# Patient Record
Sex: MALE | Race: WHITE | NOT HISPANIC OR LATINO | URBAN - METROPOLITAN AREA
[De-identification: names, ages, dates, MRNs, and addresses within clinical notes are randomized per-mention and may not be internally consistent; named-entity substitution may affect disease eponyms.]

---

## 2017-10-17 ENCOUNTER — OFFICE VISIT - RIVER FALLS (OUTPATIENT)
Dept: FAMILY MEDICINE | Facility: CLINIC | Age: 67
End: 2017-10-17

## 2017-10-17 ASSESSMENT — MIFFLIN-ST. JEOR
SCORE: 1612.67
SCORE: 1613.57

## 2017-10-25 ENCOUNTER — AMBULATORY - RIVER FALLS (OUTPATIENT)
Dept: FAMILY MEDICINE | Facility: CLINIC | Age: 67
End: 2017-10-25

## 2018-10-30 ENCOUNTER — OFFICE VISIT - RIVER FALLS (OUTPATIENT)
Dept: FAMILY MEDICINE | Facility: CLINIC | Age: 68
End: 2018-10-30

## 2018-10-30 ASSESSMENT — MIFFLIN-ST. JEOR: SCORE: 1627.18

## 2018-11-05 ENCOUNTER — AMBULATORY - RIVER FALLS (OUTPATIENT)
Dept: FAMILY MEDICINE | Facility: CLINIC | Age: 68
End: 2018-11-05

## 2018-11-06 LAB
CHOLEST SERPL-MCNC: 140 MG/DL
CHOLEST/HDLC SERPL: 2.8 {RATIO}
CREAT SERPL-MCNC: 0.79 MG/DL (ref 0.7–1.25)
GLUCOSE BLD-MCNC: 98 MG/DL (ref 65–99)
HDLC SERPL-MCNC: 50 MG/DL
LDLC SERPL CALC-MCNC: 76 MG/DL
NONHDLC SERPL-MCNC: 90 MG/DL
TRIGL SERPL-MCNC: 61 MG/DL

## 2019-10-17 ENCOUNTER — OFFICE VISIT - RIVER FALLS (OUTPATIENT)
Dept: FAMILY MEDICINE | Facility: CLINIC | Age: 69
End: 2019-10-17

## 2019-10-17 ASSESSMENT — MIFFLIN-ST. JEOR: SCORE: 1642.26

## 2019-10-21 ENCOUNTER — AMBULATORY - RIVER FALLS (OUTPATIENT)
Dept: FAMILY MEDICINE | Facility: CLINIC | Age: 69
End: 2019-10-21

## 2019-10-22 ENCOUNTER — COMMUNICATION - RIVER FALLS (OUTPATIENT)
Dept: FAMILY MEDICINE | Facility: CLINIC | Age: 69
End: 2019-10-22

## 2019-10-22 LAB
BUN SERPL-MCNC: 17 MG/DL (ref 7–25)
BUN/CREAT RATIO - HISTORICAL: NORMAL (ref 6–22)
CALCIUM SERPL-MCNC: 9.6 MG/DL (ref 8.6–10.3)
CHLORIDE BLD-SCNC: 104 MMOL/L (ref 98–110)
CHOLEST SERPL-MCNC: 141 MG/DL
CHOLEST/HDLC SERPL: 3.4 {RATIO}
CO2 SERPL-SCNC: 26 MMOL/L (ref 20–32)
CREAT SERPL-MCNC: 0.91 MG/DL (ref 0.7–1.25)
EGFRCR SERPLBLD CKD-EPI 2021: 87 ML/MIN/1.73M2
ERYTHROCYTE [DISTWIDTH] IN BLOOD BY AUTOMATED COUNT: 12.6 % (ref 11–15)
GLUCOSE BLD-MCNC: 91 MG/DL (ref 65–99)
HCT VFR BLD AUTO: 47.3 % (ref 38.5–50)
HDLC SERPL-MCNC: 41 MG/DL
HGB BLD-MCNC: 16.2 GM/DL (ref 13.2–17.1)
LDLC SERPL CALC-MCNC: 86 MG/DL
MCH RBC QN AUTO: 29.4 PG (ref 27–33)
MCHC RBC AUTO-ENTMCNC: 34.2 GM/DL (ref 32–36)
MCV RBC AUTO: 85.8 FL (ref 80–100)
NONHDLC SERPL-MCNC: 100 MG/DL
PLATELET # BLD AUTO: 271 10*3/UL (ref 140–400)
PMV BLD: 10.5 FL (ref 7.5–12.5)
POTASSIUM BLD-SCNC: 5.2 MMOL/L (ref 3.5–5.3)
RBC # BLD AUTO: 5.51 10*6/UL (ref 4.2–5.8)
SODIUM SERPL-SCNC: 138 MMOL/L (ref 135–146)
TRIGL SERPL-MCNC: 62 MG/DL
WBC # BLD AUTO: 6.8 10*3/UL (ref 3.8–10.8)

## 2020-10-19 ENCOUNTER — OFFICE VISIT - RIVER FALLS (OUTPATIENT)
Dept: FAMILY MEDICINE | Facility: CLINIC | Age: 70
End: 2020-10-19

## 2020-10-19 ASSESSMENT — MIFFLIN-ST. JEOR: SCORE: 1637.73

## 2020-10-20 LAB
BUN SERPL-MCNC: 22 MG/DL (ref 7–25)
BUN/CREAT RATIO - HISTORICAL: ABNORMAL (ref 6–22)
CALCIUM SERPL-MCNC: 9.1 MG/DL (ref 8.6–10.3)
CHLORIDE BLD-SCNC: 105 MMOL/L (ref 98–110)
CHOLEST SERPL-MCNC: 113 MG/DL
CHOLEST/HDLC SERPL: 3.1 {RATIO}
CO2 SERPL-SCNC: 30 MMOL/L (ref 20–32)
CREAT SERPL-MCNC: 0.8 MG/DL (ref 0.7–1.25)
EGFRCR SERPLBLD CKD-EPI 2021: 92 ML/MIN/1.73M2
GLUCOSE BLD-MCNC: 104 MG/DL (ref 65–99)
HDLC SERPL-MCNC: 36 MG/DL
LDLC SERPL CALC-MCNC: 64 MG/DL
NONHDLC SERPL-MCNC: 77 MG/DL
POTASSIUM BLD-SCNC: 4 MMOL/L (ref 3.5–5.3)
SODIUM SERPL-SCNC: 139 MMOL/L (ref 135–146)
TRIGL SERPL-MCNC: 44 MG/DL

## 2020-10-21 ENCOUNTER — COMMUNICATION - RIVER FALLS (OUTPATIENT)
Dept: FAMILY MEDICINE | Facility: CLINIC | Age: 70
End: 2020-10-21

## 2022-02-11 VITALS
BODY MASS INDEX: 32.75 KG/M2 | DIASTOLIC BLOOD PRESSURE: 78 MMHG | HEIGHT: 66 IN | WEIGHT: 203.8 LBS | SYSTOLIC BLOOD PRESSURE: 132 MMHG | TEMPERATURE: 98.1 F | HEART RATE: 68 BPM

## 2022-02-11 VITALS
HEART RATE: 60 BPM | OXYGEN SATURATION: 98 % | HEART RATE: 58 BPM | BODY MASS INDEX: 32.24 KG/M2 | WEIGHT: 200.8 LBS | WEIGHT: 200.6 LBS | DIASTOLIC BLOOD PRESSURE: 76 MMHG | HEIGHT: 66 IN | SYSTOLIC BLOOD PRESSURE: 116 MMHG | SYSTOLIC BLOOD PRESSURE: 134 MMHG | HEIGHT: 66 IN | DIASTOLIC BLOOD PRESSURE: 68 MMHG | OXYGEN SATURATION: 95 % | BODY MASS INDEX: 32.27 KG/M2

## 2022-02-11 VITALS
DIASTOLIC BLOOD PRESSURE: 84 MMHG | BODY MASS INDEX: 33.27 KG/M2 | HEART RATE: 68 BPM | HEIGHT: 66 IN | WEIGHT: 207 LBS | SYSTOLIC BLOOD PRESSURE: 132 MMHG | TEMPERATURE: 97.5 F

## 2022-02-11 VITALS
SYSTOLIC BLOOD PRESSURE: 122 MMHG | BODY MASS INDEX: 33.43 KG/M2 | HEART RATE: 60 BPM | HEIGHT: 66 IN | WEIGHT: 208 LBS | DIASTOLIC BLOOD PRESSURE: 70 MMHG

## 2022-02-16 NOTE — PROGRESS NOTES
Patient:   DANIELLE RODRIGUEZ            MRN: [id number]            FIN: [id number]               Age:   69 years     Sex:  Male     :  1950   Associated Diagnoses:   CAD (coronary artery disease)   Author:   Ladarius Delvalle MD      Visit Information      Date of Service: 10/17/2019 03:44 pm  Performing Location: [facility]  Encounter#: [id number]      Primary Care Provider (PCP):  Renny Albrecht PA-C    NPI# [id number]      Referring Provider:  Ladarius Delvalle MD    NPI# [id number]      Chief Complaint   10/17/2019 4:09 PM CDT   Annual Exam & CAD Med check & due for blood work; Wellness Paperwork for work     Chief complaint and symptoms noted above confirmed with patient.      Interval History   Ischemic Heart Disease   The course is progressing as expected.  The effect on daily activities is no change in activity level and no change in household functions.  Associated symptoms characterized by no fatigue, weight gain, chest pain, edema: peripheral or shortness of breath.  No anginal episodes.     Exclusions   Adherence Characterized by patient is taking meds as directed.        Review of Systems   Constitutional:  Negative.    Ear/Nose/Mouth/Throat:  Negative.    Respiratory:  Negative.    Cardiovascular:  Negative.    Gastrointestinal:  Negative.    Genitourinary:  Negative.    Hematology/Lymphatics:  Negative.    Endocrine:  Negative.    Immunologic:  Negative.    Musculoskeletal:  Negative.    Integumentary:  Negative.    Neurologic:  Negative.    Psychiatric:  Negative.       Health Status   Allergies:    Allergic Reactions (Selected)  No Known Medication Allergies   Medications:  (Selected)   Prescriptions  Prescribed  Metoprolol Tartrate 25 mg oral tablet: = 1 tab(s) ( 25 mg ), po, bid, # 180 tab(s), 3 Refill(s), Type: Maintenance, [id number], 1 tab(s) Oral bid  aspirin 81 mg oral tablet: = 1 tab(s) ( 81 mg ), PO, Daily, # 90 tab(s), 3 Refill(s),  Type: Maintenance, Pharmacy: [facility] #[id number], 1 tab(s) Oral daily  atorvastatin 80 mg oral tablet: = 1 tab(s) ( 80 mg ), PO, Daily, # 90 tab(s), 3 Refill(s), Type: Maintenance, Pharmacy: [facility] #[id number], 1 tab(s) Oral daily  lisinopril 2.5 mg oral tablet: = 1 tab(s) ( 2.5 mg ), PO, Daily, # 90 tab(s), 3 Refill(s), Type: Maintenance, Pharmacy: [facility] #[id number], 1 tab(s) Oral daily  Documented Medications  Documented  Vitamin C 500 mg oral tablet: 1 tab(s) ( 500 mg ), po, daily, 0 Refill(s), Type: Maintenance  Vitamin D3 1000 intl units oral tablet: 1 tab(s) ( 1,000 International Unit ), po, daily, 0 Refill(s), Type: Maintenance   Problem list:    All Problems  CAD (coronary artery disease) / SNOMED CT 2921839747 / Confirmed  Obesity / SNOMED CT 5350787690 / Probable      Histories   Past Medical History:    Active  Obesity (SNOMED CT 8879968725)  CAD (coronary artery disease) (SNOMED CT 1429489129)   Family History:    Cancer  Brother  Heart disease  Mother ()  Stroke  Son ([place])     Procedure history:    Angioplasty (SNOMED CT 6051149440) performed by Rigo Celis MD on 10/10/2015 at 65 Years.   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      Home and Environment Assessment            Marital status: .  Spouse/Partner name: Ligia.  Lives with Spouse.  6 children.  Living situation:               Home/Independent.      Nutrition and Health Assessment            Type of diet: Regular.  Caffeine intake amount: Coffee in the morning.      Exercise and Physical Activity Assessment             Exercise frequency: 5-6 times/week.  Exercise type: Walking.      Sexual Assessment            Sexually active: Yes.  Sexual orientation: Heterosexual.        Physical Examination   Vital Signs   10/17/2019 4:09 PM CDT Peripheral Pulse Rate 60 bpm    Pulse Site Radial artery    HR Method Manual    Systolic Blood Pressure 122 mmHg    Diastolic Blood Pressure 70 mmHg    Mean Arterial Pressure 87 mmHg    BP Site Left arm    BP Method Manual      Measurements from flowsheet : Measurements   10/17/2019 4:09 PM CDT Height Measured - Standard 65.75 in    Weight Measured - Standard 208 lb    BSA 2.09 m2    Body Mass Index 33.82 kg/m2  HI      General:  Alert and oriented, No acute distress.    Eye:  Normal conjunctiva.    HENT:  Normocephalic, Tympanic membranes are clear, Normal hearing.    Neck:  Supple, Non-tender, No carotid bruit.    Respiratory:  Lungs are clear to auscultation, Respirations are non-labored.    Cardiovascular:  Normal rate, Regular rhythm, No murmur.    Gastrointestinal:  Soft, Non-tender, Non-distended.    Genitourinary:  No costovertebral angle tenderness, No inguinal tenderness.    Lymphatics:  No lymphadenopathy neck, axilla, groin.    Musculoskeletal:  Normal range of motion, Normal strength.    Integumentary:  Warm, Dry, Pink.    Neurologic:  Alert, Oriented, Normal sensory.    Psychiatric:  Cooperative, Appropriate mood & affect, Normal judgment, Non-suicidal.       Impression and Plan   Diagnosis     Well adult (FIB03-NO Z00.00).     Encounter for screening for lipoid disorders (LSX24-DH Z13.220).     Screening for diabetes mellitus (CVT97-QU Z13.1).     Course:  Progressing as expected.    Diagnosis     CAD (coronary artery disease) (ZXP88-FE I25.10).     Course:  Progressing as expected.    Orders     Orders   Lab (Gen Lab  Reference Lab):  Lipid panel with reflex to direct ldl* ([facility]) (Order): Specimen Type: Serum, *Est. Collection Date: 10/17/2019 due within 2 week(s), Future  Order  Basic Metabolic Panel* ([facility]) (Order): Specimen Type: Serum, *Est. Collection Date: 10/17/2019 due within 2 week(s), Future Order  CBC (h/h, RBC, indices, WBC, Plt)* ([facility]) (Order): Specimen Type: Blood, *Est. Collection Date: 10/17/2019 due within 2 week(s), Future Order.

## 2022-02-16 NOTE — TELEPHONE ENCOUNTER
Entered by Geetha Kelly MA on November 03, 2021 12:39:11 PM CDT  ---------------------  From: Geetha Kelly MA   To: [facility]    Sent: 11/3/2021 12:39:11 PM CDT  Subject: Medication Management     ** Not Approved: Refill not appropriate, duplicate order **  metoprolol (metoprolol tartrate 25 mg tablet)  TAKE ONE Tablet  BY MOUTH TWICE DAILY  Qty:  180 tab(s)        Days Supply:  30        Refills:  3          Substitutions Allowed     Route To [facility]   Signed by Geetha Kelly MA            ** Submitted: **  Order:metoprolol (Metoprolol Tartrate 25 mg oral tablet)  1 tab(s)  Oral  bid  Qty:  60 tab(s)        Refills:  0          Substitutions Allowed     Route To [facility]    Signed by Geetha Kelly MA  11/3/2021 5:38:00 PM [facility]    ** Submitted: **  Complete:metoprolol (Metoprolol Tartrate 25 mg oral tablet)   Signed by Geetha Kelly MA  11/3/2021 5:38:00 PM [facility]    ** Not Approved:  **  metoprolol (metoprolol tartrate 25 mg tablet)  TAKE ONE Tablet  BY MOUTH TWICE DAILY  Qty:  180 tab(s)        Days Supply:  30        Refills:  3          Substitutions Allowed     Route To [facility]   Signed by Geetha Kelly MA            ** Not Approved: Refill not appropriate, duplicate order **  lisinopril (lisinopril 2.5 mg tablet)  TAKE ONE Tablet BY MOUTH EVERY DAY  Qty:  90 tab(s)        Days Supply:  30        Refills:  3          Substitutions Allowed     Route To [facility]   Signed by Geetha Kelly MA            ** Submitted: **  Order:lisinopril (lisinopril 2.5 mg oral tablet)  1 tab(s)  Oral  daily  Qty:  30 tab(s)        Refills:  0          Substitutions  Allowed     Route To [facility]    Signed by Geetha Kelly MA  11/3/2021 5:38:00 PM [place]    ** Submitted: **  Complete:lisinopril (lisinopril 2.5 mg oral tablet)   Signed by Geetha Kelly MA  11/3/2021 5:38:00 PM UTC    ** Not Approved:  **  lisinopril (lisinopril 2.5 mg tablet)  TAKE ONE Tablet BY MOUTH EVERY DAY  Qty:  90 tab(s)        Days Supply:  30        Refills:  3          Substitutions Allowed     Route To [facility]   Signed by Geetha Kelly MA            ** Not Approved: Refill not appropriate, duplicate order **  atorvastatin (atorvastatin 80 mg tablet)  TAKE ONE Tablet BY MOUTH EVERY DAY  Qty:  90 tab(s)        Days Supply:  30        Refills:  3          Substitutions Allowed     Route To [facility]   Signed by Geetha Kelly MA            ** Submitted: **  Order:atorvastatin (atorvastatin 80 mg oral tablet)  1 tab(s)  Oral  daily  Qty:  30 tab(s)        Refills:  0          Substitutions Allowed     Route To [facility]    Signed by Geetha Kelly MA  11/3/2021 5:36:00 PM UTC    ** Submitted: **  Complete:atorvastatin (atorvastatin 80 mg oral tablet)   Signed by Geetha Kelly MA  11/3/2021 5:37:00 PM UTC    ** Not Approved:  **  atorvastatin (atorvastatin 80 mg tablet)  TAKE ONE Tablet BY MOUTH EVERY DAY  Qty:  90 tab(s)        Days Supply:  30        Refills:  3          Substitutions Allowed     Route To [facility]   Signed by Geetha Kelly MA            ------------------------------------------  From: [facility]  To: Jordan Ahn MD  Sent: November 1, 2021 9:11:20 AM CDT  Subject: Medication Management  Due: October  21, 2021 8:18:04 PM CDT     ** On Hold Pending Signature **     Drug: metoprolol (Metoprolol Tartrate 25 mg oral tablet), 1 tab(s) Oral bid  Quantity: 180 tab(s)  Days Supply: 0  Refills: 2  Substitutions Allowed  Notes from Pharmacy:     Dispensed Drug: metoprolol (Metoprolol Tartrate 25 mg oral tablet), TAKE ONE Tablet BY MOUTH TWICE DAILY  Quantity: 180 tab(s)  Days Supply: 30  Refills: 3  Substitutions Allowed  Notes from Pharmacy:     ** On Hold Pending Signature **     Drug: lisinopril (lisinopril 2.5 mg oral tablet), 1 tab(s) Oral daily  Quantity: 90 tab(s)  Days Supply: 0  Refills: 2  Substitutions Allowed  Notes from Pharmacy:     Dispensed Drug: lisinopril (lisinopril 2.5 mg oral tablet), TAKE ONE Tablet BY MOUTH EVERY DAY  Quantity: 90 tab(s)  Days Supply: 30  Refills: 3  Substitutions Allowed  Notes from Pharmacy:     ** On Hold Pending Signature **     Drug: atorvastatin (atorvastatin 80 mg oral tablet), 1 tab(s) Oral daily  Quantity: 90 tab(s)  Days Supply: 0  Refills: 2  Substitutions Allowed  Notes from Pharmacy:     Dispensed Drug: atorvastatin (atorvastatin 80 mg oral tablet), TAKE ONE Tablet BY MOUTH EVERY DAY  Quantity: 90 tab(s)  Days Supply: 30  Refills: 3  Substitutions Allowed  Notes from Pharmacy:     ** On Hold Pending Signature **     Drug: atorvastatin (atorvastatin 80 mg oral tablet), 1 tab(s) Oral daily  Quantity: 90 tab(s)  Days Supply: 0  Refills: 2  Substitutions Allowed  Notes from Pharmacy:     Dispensed Drug: atorvastatin (atorvastatin 80 mg oral tablet), TAKE ONE Tablet BY MOUTH EVERY DAY  Quantity: 90 tab(s)  Days Supply: 30  Refills: 3  Substitutions Allowed  Notes from Pharmacy:     ** On Hold Pending Signature **     Drug: lisinopril (lisinopril 2.5 mg oral tablet), 1 tab(s) Oral daily  Quantity: 90 tab(s)  Days Supply: 0  Refills: 2  Substitutions Allowed  Notes from Pharmacy:     Dispensed Drug: lisinopril (lisinopril 2.5 mg oral tablet), TAKE ONE Tablet BY MOUTH EVERY  DAY  Quantity: 90 tab(s)  Days Supply: 30  Refills: 3  Substitutions Allowed  Notes from Pharmacy:     ** On Hold Pending Signature **     Drug: metoprolol (Metoprolol Tartrate 25 mg oral tablet), 1 tab(s) Oral bid  Quantity: 180 tab(s)  Days Supply: 0  Refills: 2  Substitutions Allowed  Notes from Pharmacy:     Dispensed Drug: metoprolol (Metoprolol Tartrate 25 mg oral tablet), TAKE ONE Tablet BY MOUTH TWICE DAILY  Quantity: 180 tab(s)  Days Supply: 30  Refills: 3  Substitutions Allowed  Notes from Pharmacy:  ------------------------------------------RTC due, reminder letters sent, message sent to pharmacy

## 2022-02-16 NOTE — LETTER
(Inserted Image. Unable to display)   October 20, 2021    DANI[place] [address]            Dear DANIELLE,      Thank you for selecting [facility] for your healthcare needs.    Our records indicate you are due for the following services:    Annual Physical.    Fasting Lab Tests ~ Please do not eat or drink anything 10 hours prior to your scheduled appointment time.  (Water and any medications that you may need are allowed unless directed otherwise.)    If you had your labs done at another facility or with Direct Access Lab Testing at [facility], please bring in a copy of the results to your next visit, mail a copy, or drop off a copy of your results to your Healthcare Provider.    (FYI   Regarding office visits: In some instances, a video visit or telephone visit may be offered as an option.)    You are due for lab work and an office visit; please schedule the lab appointment 1 week before the office visit.  This will assure all results are available to discuss with your Healthcare Provider during your visit.    **It is very helpful if you bring your medication bottles to your appointment.  This assures we have all of your current medications, including strength and dosing information, documented accurately in your medical record.    To schedule an appointment or if you have further questions, please contact your clinic at [phone].      Powered by [employer]    Sincerely,    Jordan Ahn MD

## 2022-02-16 NOTE — PROGRESS NOTES
Patient:   DANIELLE RODRIGUEZ            MRN: [id number]            FIN: [id number]               Age:   67 years     Sex:  Male     :  1950   Associated Diagnoses:   CAD (coronary artery disease); Annual physical exam; Tinea corporis   Author:   Malka Borrego MD      Chief Complaint   10/17/2017 5:14 PM CDT   Patient here for physical, with work forms. Patient also has rash on left ankle x 2 months that is not clearing up.      Well Adult History   Well Adult History             The patient presents for well adult exam.  The patient's general health status is described as good.  Saw the cardiologist today as well. No changes in medications. No labs done - discussed with patient that he could have fasting labs for lipid, chem 8 given medication. He will consider..  The patient's diet is described as balanced.  Associated symptoms consist of none.  Medical encounters:  Will do stool for blood again this year, was negative last year.  Complaint: Rash on leg - needs cream. Failed lamisil..  Additional pertinent history: no caffeine use, tobacco use none and no alcohol use.        Review of Systems   ROS reviewed as documented in chart      Health Status   Allergies:    Allergic Reactions (All)  No Known Medication Allergies   Medications:  (Selected)   Documented Medications  Documented  Ginkgo oral tablet: 0 Refill(s), Type: Maintenance  Metoprolol Tartrate 25 mg oral tablet: 1 tab(s) ( 25 mg ), po, bid, 0 Refill(s), Type: Maintenance  Multiple Vitamins with Minerals oral tablet: 1 tab(s), po, daily, 0 Refill(s), Type: Maintenance  Vitamin C 500 mg oral tablet: 1 tab(s) ( 500 mg ), po, daily, 0 Refill(s), Type: Maintenance  Vitamin D3 1000 intl units oral tablet: 1 tab(s) ( 1,000 International Unit ), po, daily, 0 Refill(s), Type: Maintenance  aspirin 81 mg oral tablet: 1 tab(s) ( 81 mg ), PO, Daily, # 30 tab(s), 0 Refill(s), Type: Maintenance  atorvastatin 80 mg oral tablet: 1 tab(s) ( 80 mg ), PO, Daily,  # 90 tab(s), 0 Refill(s), Type: Maintenance  cinnamon: ( 1,000 mg ), po, 0 Refill(s), Type: Maintenance  lisinopril 2.5 mg oral tablet: 1 tab(s) ( 2.5 mg ), PO, Daily, # 90 tab(s), 0 Refill(s), Type: Maintenance  omega-3 polyunsaturated fatty acids oral capsule: 0 Refill(s), Type: Maintenance  vitamin A: 0 Refill(s), Type: Maintenance   Problem list:    All Problems  Obesity / SNOMED CT 6458639421 / Probable  CAD (coronary artery disease) / SNOMED CT 2935790566 / Confirmed      Histories   Past Medical History:    No active or resolved past medical history items have been selected or recorded.   Family History:    Cancer  Brother  Heart disease  Mother  Stroke  Son     Procedure history:    Angioplasty ([id number]) on 10/10/2015 at 65 Years.   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      Home and Environment Assessment            Marital status: .  Spouse/Partner name: Ligia.      Exercise and Physical Activity Assessment            Exercise frequency: 5-6 times/week.  Exercise type: Walking.      Sexual Assessment            Sexually active: Yes.  Sexual orientation: Heterosexual.        Physical Examination   Vital Signs   10/17/2017 5:14 PM CDT Peripheral Pulse Rate 60 bpm    Systolic Blood Pressure 116 mmHg    Diastolic Blood Pressure 68 mmHg    Mean Arterial Pressure 84 mmHg    Oxygen Saturation 98 %      Measurements from flowsheet : Measurements   10/17/2017 5:14 PM CDT Height Measured - Standard 66 in    Weight Measured - Standard 200.6 lb    BSA 2.06 m2    Body Mass Index 32.37 kg/m2   10/17/2017 4:11 PM CDT Height Measured - Standard 66 in    Weight Measured - Standard 200.8 lb    BSA 2.06 m2     Body Mass Index 32.41 kg/m2      General:  No acute distress.    Eye:  Pupils are equal, round and reactive to light, Extraocular movements are intact, Normal conjunctiva.    HENT:  Normocephalic, Tympanic membranes are clear, Oral mucosa is moist, No pharyngeal erythema.    Neck:  Supple, Non-tender, No lymphadenopathy, No thyromegaly.    Respiratory:  Lungs are clear to auscultation, Respirations are non-labored, Breath sounds are equal.    Cardiovascular:  Normal rate, Regular rhythm, No murmur.    Gastrointestinal:  Soft, Non-tender, Non-distended, Normal bowel sounds, No organomegaly.    Genitourinary:  No costovertebral angle tenderness.    Integumentary:  red circular discrete lesions 3-6cm in diameter on lower left leg.    Neurologic:  No focal deficits.    Psychiatric:  Cooperative, Appropriate mood & affect.       Health Maintenance      Impression and Plan   Diagnosis     CAD (coronary artery disease) (ZTA05-UP I25.10).     Annual physical exam (LCD43-MQ Z00.00).     Tinea corporis (TUG24-RN B35.4).     Course:  Progressing as expected.    Plan:  Continue medications, monitored by cardiology. Up to date on preventive services other than labs which he will consider..    Orders     Orders (Selected)   Outpatient Orders  Order  Return to Clinic (Request): Return in 1 year  Prescriptions  Prescribed  Lotrisone 1%-0.05% topical cream: 1 addie, TOP, BID, # 45 g, 1 Refill(s), Type: Maintenance, Pharmacy: [id number], 1 addie top bid  Documented Medications  Documented  Ginkgo oral tablet: 0 Refill(s), Type: Maintenance  Metoprolol Tartrate 25 mg oral tablet: 1 tab(s) ( 25 mg ), po, bid, 0 Refill(s), Type: Maintenance  Multiple Vitamins with Minerals oral tablet: 1 tab(s), po, daily, 0 Refill(s), Type: Maintenance  Vitamin C 500 mg oral tablet: 1 tab(s) ( 500 mg ), po, daily, 0 Refill(s), Type: Maintenance  Vitamin D3 1000 intl units oral tablet: 1 tab(s) ( 1,000 International Unit ), po, daily, 0  Refill(s), Type: Maintenance  aspirin 81 mg oral tablet: 1 tab(s) ( 81 mg ), PO, Daily, # 30 tab(s), 0 Refill(s), Type: Maintenance  atorvastatin 80 mg oral tablet: 1 tab(s) ( 80 mg ), PO, Daily, # 90 tab(s), 0 Refill(s), Type: Maintenance  cinnamon: ( 1,000 mg ), po, 0 Refill(s), Type: Maintenance  lisinopril 2.5 mg oral tablet: 1 tab(s) ( 2.5 mg ), PO, Daily, # 90 tab(s), 0 Refill(s), Type: Maintenance  omega-3 polyunsaturated fatty acids oral capsule: 0 Refill(s), Type: Maintenance  vitamin A: 0 Refill(s), Type: Maintenance.     PHQ-9 and Cage screenings performed and reviewed with the patient. Will do stool for blood.

## 2022-02-16 NOTE — NURSING NOTE
Hearing and Vision Screening Entered On:  10/18/2019 9:17 AM CDT    Performed On:  10/18/2019 9:17 AM CDT by Elmira Figueroa MA               Hearing and Vision Screening   Audiogram Result Right Ear :   Fail   Audiogram Result Left Ear :   Fail   Hearing Screen Comments :   MIssed 4000hz both ears    Elmira Figueroa MA - 10/18/2019 9:17 AM CDT

## 2022-02-16 NOTE — PROGRESS NOTES
Patient:   DANIELLE RODRIGUEZ            MRN: [id number]            FIN: [id number]               Age:   68 years     Sex:  Male     :  1950   Associated Diagnoses:   CAD (coronary artery disease); Well adult exam   Author:   Malka Borrego MD      Chief Complaint   10/30/2018 6:02 PM CDT   physical        Well Adult History   Well Adult History             The patient presents for well adult exam.  The patient's general health status is described as good and Hx of CAD, no symptoms, has not seen cardiology this year, needs refills, will return for fasting labs.  The patient's diet is described as balanced.  Exercise: routine.  Additional pertinent history: FIT testing for colon cancer screening.        Health Status   Allergies:    Allergic Reactions (All)  No Known Medication Allergies   Medications:  (Selected)   Prescriptions  Prescribed  Lotrisone 1%-0.05% topical cream: 1 addie, TOP, BID, # 45 g, 1 Refill(s), Type: Maintenance, Pharmacy: [facility] #[id number], 1 addie top bid  Metoprolol Tartrate 25 mg oral tablet: = 1 tab(s) ( 25 mg ), po, bid, # 180 tab(s), 3 Refill(s), Type: Maintenance, Pharmacy: [facility] #[id number], 1 tab(s) Oral bid  atorvastatin 80 mg oral tablet: = 1 tab(s) ( 80 mg ), PO, Daily, # 90 tab(s), 3 Refill(s), Type: Maintenance, Pharmacy: [facility] #[id number], 1 tab(s) Oral daily  lisinopril 2.5 mg oral tablet: = 1 tab(s) ( 2.5 mg ), PO, Daily, # 90 tab(s), 3 Refill(s), Type: Maintenance, Pharmacy: [facility] #[id number], 1 tab(s) Oral daily  Documented Medications  Documented  Ginkgo oral tablet: 0 Refill(s), Type: Maintenance  Multiple Vitamins with Minerals oral tablet: 1 tab(s), po, daily, 0 Refill(s), Type: Maintenance  Vitamin C 500 mg oral tablet: 1 tab(s) ( 500 mg ), po, daily, 0 Refill(s), Type: Maintenance  Vitamin D3 1000 intl units oral tablet: 1 tab(s) ( 1,000 International Unit ), po, daily, 0 Refill(s), Type: Maintenance  aspirin 81 mg oral tablet: 1 tab(s) ( 81 mg ),  PO, Daily, # 30 tab(s), 0 Refill(s), Type: Maintenance  cinnamon: ( 1,000 mg ), po, 0 Refill(s), Type: Maintenance  omega-3 polyunsaturated fatty acids oral capsule: 0 Refill(s), Type: Maintenance  vitamin A: 0 Refill(s), Type: Maintenance   Problem list:    All Problems  CAD (coronary artery disease) / SNOMED CT 7493619535 / Confirmed  Obesity / SNOMED CT 7387635579 / Probable      Histories   Past Medical History:    No active or resolved past medical history items have been selected or recorded.   Family History:    Cancer  Brother  Heart disease  Mother ()  Stroke  Son (Corwin)     Procedure history:    Angioplasty ([id number]) on 10/10/2015 at 65 Years.   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      Home and Environment Assessment            Marital status: .  Spouse/Partner name: Ligia.      Exercise and Physical Activity Assessment            Exercise frequency: 5-6 times/week.  Exercise type: Walking.      Sexual Assessment            Sexually active: Yes.  Sexual orientation: Heterosexual.        Physical Examination   Vital Signs   10/30/2018 6:02 PM CDT Temperature Tympanic 98.1 DegF    Peripheral Pulse Rate 68 bpm    Pulse Site Radial artery    HR Method Manual    Systolic Blood Pressure 132 mmHg  HI    Diastolic Blood Pressure 78 mmHg    Mean Arterial Pressure 96 mmHg    BP Site Left arm    BP Method Manual      Measurements from flowsheet : Measurements   10/30/2018 6:02 PM CDT Height Measured - Standard 66 in    Weight Measured - Standard 203.8 lb    BSA 2.07 m2    Body Mass Index 32.89 kg/m2  HI      General:  Alert and  oriented, No acute distress.    Eye:  Pupils are equal, round and reactive to light, Normal conjunctiva.    HENT:  Normocephalic, Normal hearing, No pharyngeal erythema, right cerumen impaction, left TM is normal.    Neck:  Supple, Non-tender, No lymphadenopathy, No thyromegaly.    Respiratory:  Lungs are clear to auscultation, Respirations are non-labored, Breath sounds are equal.    Cardiovascular:  Normal rate, Regular rhythm.    Musculoskeletal:  Normal range of motion, No deformity.    Integumentary:  Warm, Dry.    Neurologic:  Alert, Oriented.       Health Maintenance      Impression and Plan   Diagnosis     CAD (coronary artery disease) (BGW62-HE I25.10).     Well adult exam (APY69-LI Z00.00).     Course:  Progressing as expected.    Orders     Orders (Selected)   Outpatient Orders  Ordered  Return to Clinic (Request): RFV: Annual px, Return in 1 year  Return to Clinic (Request): RFV: fasting labs: chem 8, lipid, Return in 3 weeks  Prescriptions  Prescribed  Metoprolol Tartrate 25 mg oral tablet: = 1 tab(s) ( 25 mg ), po, bid, # 180 tab(s), 3 Refill(s), Type: Maintenance, Pharmacy: [facility] #[id number], 1 tab(s) Oral bid  atorvastatin 80 mg oral tablet: = 1 tab(s) ( 80 mg ), PO, Daily, # 90 tab(s), 3 Refill(s), Type: Maintenance, Pharmacy: [facility] #[id number], 1 tab(s) Oral daily  lisinopril 2.5 mg oral tablet: = 1 tab(s) ( 2.5 mg ), PO, Daily, # 90 tab(s), 3 Refill(s), Type: Maintenance, Pharmacy: [facility] #[id number], 1 tab(s) Oral daily.

## 2022-02-16 NOTE — LETTER
(Inserted Image. Unable to display)   October 21, 2020        DANIELLE RODRIGUEZ      [address]   [place], [place] [id number]        Dear DANIELLE,    Thank you for selecting [facility] for your health care needs.  Below you will find the results of your recent test(s) done at our clinic.     Your tests look good.      Result Name Current Result Previous Result Reference Range   Sodium Level (mmol/L)  139 10/19/2020  138 10/21/2019 135 - 146   Potassium Level (mmol/L)  4.0 10/19/2020  5.2 10/21/2019 3.5 - 5.3   Chloride Level (mmol/L)  105 10/19/2020  104 10/21/2019 98 - 110   CO2 Level (mmol/L)  30 10/19/2020  26 10/21/2019 20 - 32   Glucose Level (mg/dL) ((H)) 104 10/19/2020  91 10/21/2019 65 - 99   BUN (mg/dL)  22 10/19/2020  17 10/21/2019 7 - 25   Creatinine Level (mg/dL)  0.80 10/19/2020  0.91 10/21/2019 0.70 - 1.25   eGFR (mL/min/1.73m2)  92 10/19/2020  87 10/21/2019 > OR = 60 -    Calcium Level (mg/dL)  9.1 10/19/2020  9.6 10/21/2019 8.6 - 10.3   Cholesterol (mg/dL)  113 10/19/2020  141 10/21/2019  - <200   Non-HDL Cholesterol  77 10/19/2020  100 10/21/2019  - <130   HDL (mg/dL) ((L)) 36 10/19/2020  41 10/21/2019 > OR = 40 -    Cholesterol/HDL Ratio  3.1 10/19/2020  3.4 10/21/2019  - <5.0   LDL  64 10/19/2020  86 10/21/2019    Triglyceride (mg/dL)  44 10/19/2020  62 10/21/2019  - <150       Please contact me or my assistant at [phone] if you have any questions about your results.    Sincerely,        Emery Ahn MD        What do your labs mean?  Below is a glossary of commonly ordered labs:  LDL   Bad Cholesterol   HDL   Good Cholesterol  AST/ALT   Liver Function   Cr/Creatinine   Kidney Function  Microalbumin   Kidney Function  BUN   Kidney Function  PSA   Prostate    TSH   Thyroid Hormone  HgbA1c   Diabetes Test   Hgb (Hemoglobin)   Red Blood Cells

## 2022-02-16 NOTE — NURSING NOTE
Depression Screening Entered On:  10/20/2020 2:28 PM CDT    Performed On:  10/19/2020 2:28 PM CDT by Ruma Cespedes               Depression Screening   Little Interest - Pleasure in Activities :   Not at all   Feeling Down, Depressed, Hopeless :   Not at all   Initial Depression Screen Score :   0 Score   Poor Appetite or Overeating :   Not at all   Trouble Falling or Staying Asleep :   Not at all   Feeling Tired or Little Energy :   Not at all   Feeling Bad About Yourself :   Not at all   Trouble Concentrating :   Not at all   Moving or Speaking Slowly :   Not at all   Thoughts Better Off Dead or Hurting Self :   Not at all   Detailed Depression Screen Score :   0    Total Depression Screen Score :   0    Ruma Cespedes - 10/20/2020 2:28 PM CDT

## 2022-02-16 NOTE — NURSING NOTE
CAGE Assessment Entered On:  10/18/2019 9:18 AM CDT    Performed On:  10/18/2019 9:17 AM CDT by Elmira Figueroa MA               Assessment   Have you ever felt you should cut down on your drinking :   No   Have people annoyed you by criticizing your drinking :   No   Have you ever felt bad or guilty about your drinking :   No   Have you ever taken a drink first thing in the morning to steady your nerves or get rid of a hangover (Eye-opener) :   No   CAGE Score :   0    Elmira Figueroa MA - 10/18/2019 9:17 AM CDT

## 2022-02-16 NOTE — NURSING NOTE
Comprehensive Intake Entered On:  10/19/2020 7:02 AM CDT    Performed On:  10/19/2020 6:55 AM CDT by Zahida Zaidi LPN               Summary   Chief Complaint :   Annual physical, medication refills, Wellness PPW   Menstrual Status :   N/A   Weight Measured :   207 lb(Converted to: 207 lb 0 oz, 93.894 kg)    Height Measured :   65.75 in(Converted to: 5 ft 6 in, 167.00 cm)    Body Mass Index :   33.66 kg/m2 (HI)    Body Surface Area :   2.08 m2   Systolic Blood Pressure :   132 mmHg (HI)    Diastolic Blood Pressure :   84 mmHg (HI)    Mean Arterial Pressure :   100 mmHg   Peripheral Pulse Rate :   68 bpm   BP Site :   Right arm   Pulse Site :   Radial artery   BP Method :   Manual   HR Method :   Manual   Temperature Tympanic :   97.5 DegF(Converted to: 36.4 DegC)  (LOW)    Zahida Zaidi LPN - 10/19/2020 6:55 AM CDT   Health Status   Allergies Verified? :   Yes   Medication History Verified? :   Yes   Pre-Visit Planning Status :   Completed   Tobacco Use? :   Former smoker   Zahida Zaidi LPN - 10/19/2020 6:55 AM CDT   Consents   Consent for Immunization Exchange :   Consent Granted   Consent for Immunizations to Providers :   Consent Granted   Zahida Zaidi LPN - 10/19/2020 6:55 AM CDT   Problems   (As Of: 10/19/2020 7:02:53 AM CDT)   Problems(Active)    CAD (coronary artery disease) (SNOMED CT  :8128694246 )  Name of Problem:   CAD (coronary artery disease) ; Recorder:   Renny Albrecht PA-C; Confirmation:   Confirmed ; Classification:   Medical ; Code:   3219037982 ; Contributor System:   PowerChart ; Last Updated:   11/12/2018 2:07 PM CST ; Life Cycle Status:   Active ; Responsible Provider:   Renny Albrecht PA-C; Vocabulary:   SNOMED CT        Obesity (SNOMED CT  :6012749012 )  Name of Problem:   Obesity ; Recorder:   SYSTEM; Confirmation:   Probable ; Classification:   Medical ; Code:   1144557575 ; Contributor System:   PowerChart ; Last Updated:   11/12/2018 2:07 PM CST ; Life Cycle Status:    Active ; Vocabulary:   SNOMED CT          Meds / Allergies   (As Of: 10/19/2020 7:02:53 AM CDT)   Allergies (Active)   No Known Medication Allergies  Estimated Onset Date:   Unspecified ; Created By:   Zahida Kramer CMA; Reaction Status:   Active ; Category:   Drug ; Substance:   No Known Medication Allergies ; Type:   Allergy ; Updated By:   Zahida Kramer CMA; Reviewed Date:   10/19/2020 7:00 AM CDT        Medication List   (As Of: 10/19/2020 7:02:53 AM CDT)   Prescription/Discharge Order    lisinopril  :   lisinopril ; Status:   Prescribed ; Ordered As Mnemonic:   lisinopril 2.5 mg oral tablet ; Simple Display Line:   2.5 mg, 1 tab(s), PO, Daily, 90 tab(s), 3 Refill(s) ; Ordering Provider:   Ladarius Delvalle MD; Catalog Code:   lisinopril ; Order Dt/Tm:   10/17/2019 4:27:25 PM CDT          aspirin  :   aspirin ; Status:   Prescribed ; Ordered As Mnemonic:   aspirin 81 mg oral tablet ; Simple Display Line:   81 mg, 1 tab(s), PO, Daily, 90 tab(s), 3 Refill(s) ; Ordering Provider:   Ladarius Delvalle MD; Catalog Code:   aspirin ; Order Dt/Tm:   10/17/2019 4:27:25 PM CDT          atorvastatin  :   atorvastatin ; Status:   Prescribed ; Ordered As Mnemonic:   atorvastatin 80 mg oral tablet ; Simple Display Line:   80 mg, 1 tab(s), PO, Daily, 90 tab(s), 3 Refill(s) ; Ordering Provider:   Ladarius Delvalle MD; Catalog Code:   atorvastatin ; Order Dt/Tm:   10/17/2019 4:27:24 PM CDT          metoprolol  :   metoprolol ; Status:   Prescribed ; Ordered As Mnemonic:   Metoprolol Tartrate 25 mg oral tablet ; Simple Display Line:   25 mg, 1 tab(s), po, bid, 180 tab(s), 3 Refill(s) ; Ordering Provider:   Ladarius Delvalle MD; Catalog Code:   metoprolol ; Order Dt/Tm:   10/17/2019 4:27:24 PM CDT            Home Meds    cholecalciferol  :   cholecalciferol ; Status:   Documented ; Ordered As Mnemonic:   Vitamin D3 1000 intl units oral tablet ; Simple Display Line:   1,000 International Unit, 1 tab(s), po, daily,  0 Refill(s) ; Catalog Code:   cholecalciferol ; Order Dt/Tm:   10/13/2015 12:15:13 PM CDT          ascorbic acid  :   ascorbic acid ; Status:   Documented ; Ordered As Mnemonic:   Vitamin C 500 mg oral tablet ; Simple Display Line:   500 mg, 1 tab(s), po, daily, 0 Refill(s) ; Catalog Code:   ascorbic acid ; Order Dt/Tm:   10/13/2015 12:14:23 PM CDT            ID Risk Screen   Recent Travel History :   No recent travel   Family Member Travel History :   No recent travel   Other Exposure to Infectious Disease :   Unknown   Zahida Zaidi LPN - 10/19/2020 6:55 AM CDT

## 2022-02-16 NOTE — CARE COORDINATION
Pt appears on _[facility] chronic disease panel as out of parameters for __IVD/LDL.  Pt has RTC for 10/2018 with cardiology and PCP for AWV, fasting labs added.

## 2022-02-16 NOTE — PROGRESS NOTES
Patient:   DANIELLE RODRIGUEZ            MRN: [id number]            FIN: [id number]               Age:   70 years     Sex:  Male     :  1950   Associated Diagnoses:   Well adult; CAD (coronary artery disease)   Author:   Jordan Ahn MD      Visit Information      Date of Service: 10/19/2020 06:51 am  Performing Location: [facility]  Encounter#: [id number]      Primary Care Provider (PCP):  Renny Albrecht PA-C    NPI# [id number]      Referring Provider:  Jordan Ahn MD    NPI# [id number]   Visit type:  Annual exam.    Accompanied by:  No one.    Source of history:  Self, Medical record.    History limitation:  None.       Chief Complaint   10/19/2020 6:55 AM CDT   Annual physical, medication refills, Wellness PPW      Well Adult History   Well Adult History             The patient presents for well adult exam.  The patient's general health status is described as good.  The patient's diet is described as balanced.  Exercise: routine, aerobic activity, 60 minutes, 5  times per week.  Associated symptoms consist of none.  Medical encounters: none.  Compliance problems: none.  Additional pertinent history: daily caffeine use, tobacco use none and weekly alcohol use.       colon cancer screening:  due for FIT  lipid and diabetes screening:  due  prostate cancer screening:  n/a  heart disease risk:  has CAD  immunizations:  declines influenza and pneumonia  other:  CAD is controlled, compliant with medication, no anginal symptoms         Review of Systems   Constitutional:  No fever, No chills, No sweats, No weakness, No fatigue.    Eye:  No recent visual problem.    Ear/Nose/Mouth/Throat:  Decreased hearing, No nasal congestion, No sore throat.    Respiratory:  No shortness of breath, No cough.    Cardiovascular:  Negative, No chest pain, No palpitations, No peripheral edema.    Gastrointestinal:  No nausea, No vomiting, No diarrhea, No constipation, No heartburn.    Genitourinary:   No dysuria, No change in urine stream.    Hematology/Lymphatics:  No bruising tendency, No bleeding tendency.    Endocrine:  No cold intolerance, No heat intolerance.    Immunologic:  Negative.    Musculoskeletal:  No back pain, No neck pain, No joint pain, No muscle pain.    Integumentary:  No rash, No dryness, No skin lesion.    Neurologic:  Alert and oriented X4, No headache.    Psychiatric:  No anxiety, No depression.      PHQ9 and CAGE reviewed and discussed with patient.       Health Status   Allergies:    Allergic Reactions (Selected)  No Known Medication Allergies   Medications:  (Selected)   Prescriptions  Prescribed  Metoprolol Tartrate 25 mg oral tablet: = 1 tab(s) ( 25 mg ), po, bid, # 180 tab(s), 3 Refill(s), Type: Maintenance, Pharmacy: [facility] #[id number], 1 tab(s) Oral bid  aspirin 81 mg oral tablet: = 1 tab(s) ( 81 mg ), PO, Daily, # 90 tab(s), 3 Refill(s), Type: Maintenance, Pharmacy: [facility] #[id number], 1 tab(s) Oral daily  atorvastatin 80 mg oral tablet: = 1 tab(s) ( 80 mg ), PO, Daily, # 90 tab(s), 3 Refill(s), Type: Maintenance, Pharmacy: [facility] #[id number], 1 tab(s) Oral daily  lisinopril 2.5 mg oral tablet: = 1 tab(s) ( 2.5 mg ), PO, Daily, # 90 tab(s), 3 Refill(s), Type: Maintenance, Pharmacy: [facility] #[id number], 1 tab(s) Oral daily  Documented Medications  Documented  Vitamin C 500 mg oral tablet: 1 tab(s) ( 500 mg ), po, daily, 0 Refill(s), Type: Maintenance  Vitamin D3 1000 intl units oral tablet: 1 tab(s) ( 1,000 International Unit ), po, daily, 0 Refill(s), Type: Maintenance   Problem list:    All Problems  Obesity / SNOMED CT 7853493293 / Probable  CAD (coronary artery disease) / SNOMED CT 0617760124 / Confirmed      Histories   Past Medical History:    Active  Obesity (6858539649)  CAD (coronary artery disease) (0210275216)   Family History:    Cancer  Brother  Heart disease  Mother ()  Stroke  Son (Corwin)     Procedure history:    Angioplasty  ([facility] [place] [id number]) on 10/10/2015 at 65 Years.   Social History:        Alcohol Assessment: Past            Past, Beer (12 oz), 1-2 times per week, 12 drinks/episode average.                     Comments:                      10/17/2016 - Robertoileana KEVINZahida                     37 years ago      Tobacco Assessment: Past            Former smoker, Cigarettes            Total pack years: 20.      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      Home and Environment Assessment            Marital status: .  Spouse/Partner name: Ligia.  Lives with Spouse.  6 children.  Living situation:               Home/Independent.  Injuries/Abuse/Neglect in household: No.  Feels unsafe at home: No.  Family/Friends               available for support: Yes.      Nutrition and Health Assessment            Type of diet: Regular.  Caffeine intake amount: Coffee in the morning.  Wants to lose weight: No.  Sleeping               concerns: No.  Feels highly stressed: No.      Exercise and Physical Activity Assessment: Regular exercise            Exercise frequency: 5-6 times/week.  Exercise type: Walking.      Sexual Assessment            Sexually active: Yes.  Sexual orientation: Heterosexual.            Identifies as male, History of STD: No.  History of sexual abuse: No.        Physical Examination   Vital Signs   10/19/2020 6:55 AM CDT Temperature Tympanic 97.5 DegF  LOW    Peripheral Pulse Rate 68 bpm    Pulse Site Radial artery    HR Method Manual    Systolic Blood Pressure 132 mmHg  HI    Diastolic Blood Pressure 84 mmHg  HI    Mean Arterial Pressure 100 mmHg    BP Site Right arm    BP Method Manual      Measurements from flowsheet : Measurements   10/19/2020 6:55 AM CDT Height Measured - Standard 65.75 in    Weight Measured - Standard 207 lb    BSA  2.08 m2    Body Mass Index 33.66 kg/m2  HI      General:  Alert and oriented, No acute distress.    Eye:  Pupils are equal, round and reactive to light, Extraocular movements are intact, Normal conjunctiva.    HENT:  Normocephalic, Tympanic membranes are clear, Oral mucosa is moist, No pharyngeal erythema, No sinus tenderness.    Neck:  Supple, Non-tender, No carotid bruit, No lymphadenopathy, No thyromegaly.    Respiratory:  Lungs are clear to auscultation, Respirations are non-labored, Breath sounds are equal, No chest wall tenderness.    Cardiovascular:  Normal rate, Regular rhythm, No murmur, No gallop, Good pulses equal in all extremities, Normal peripheral perfusion, No edema.    Gastrointestinal:  Soft, Non-tender, Non-distended, Normal bowel sounds, No organomegaly.         Abdomen: Obese.    Genitourinary:  No costovertebral angle tenderness.    Lymphatics:  WNL.    Musculoskeletal:  Normal range of motion, Normal strength, No tenderness, No swelling, No deformity.    Integumentary:  Warm, Dry, No rash.    Neurologic:  Alert, Oriented, Normal sensory, Normal motor function, No focal deficits.    Psychiatric:  Cooperative, Appropriate mood & affect.       Impression and Plan   Diagnosis     Well adult (XLN34-ME Z00.00).     Course:  Progressing as expected.    Plan:  Discussed health maintenance, diet, exercise, BMI discussed, Activity recommendations:  150-300 minutes of moderate physical activity per week; moderate or greater intensity muscle strengthening activity 2 or more days a week  Diet recommendations:  Eating plan to promote a caloric deficit of 500-750 kcal per day.  Mediterranean or DASH diet offer well balanced food choices. , FIT ordered.    Diagnosis     CAD (coronary artery disease) (HPI28-EL I25.10).     Course:  Progressing as expected, Well controlled.    Plan:  continue current medication, encouraged heart healthy diet, labs today.    Orders     Orders (Selected)   Outpatient  Orders  Ordered (In Transit)  Basic Metabolic Panel* ([facility]): Specimen Type: Serum, Collection Date: 10/19/20 7:22:00 CDT  Lipid panel with reflex to direct ldl* ([facility]): Specimen Type: Serum, Collection Date: 10/19/20 7:22:00 CDT  Prescriptions  Prescribed  Metoprolol Tartrate 25 mg oral tablet: = 1 tab(s) ( 25 mg ), po, bid, # 180 tab(s), 3 Refill(s), Type: Maintenance, Pharmacy: [facility], 1 tab(s) Oral bid, 65.75, in, 10/19/20 6:55:00 CDT, Height Measured, 207, lb, 10/19/20 6:55:...  atorvastatin 80 mg oral tablet: = 1 tab(s) ( 80 mg ), PO, Daily, # 90 tab(s), 3 Refill(s), Type: Maintenance, Pharmacy: [facility], 1 tab(s) Oral daily, 65.75, in, 10/19/20 6:55:00 CDT, Height Measured, 207, lb, 10/19/20 6:...  lisinopril 2.5 mg oral tablet: = 1 tab(s) ( 2.5 mg ), PO, Daily, # 90 tab(s), 3 Refill(s), Type: Maintenance, Pharmacy: [facility], 1 tab(s) Oral daily, 65.75, in, 10/19/20 6:55:00 CDT, Height Measured, 207, lb, 10/19/20 6....

## 2022-02-16 NOTE — NURSING NOTE
Comprehensive Intake Entered On:  10/17/2019 4:13 PM CDT    Performed On:  10/17/2019 4:09 PM CDT by Sharron Mckenzie CMA               Summary   Chief Complaint :   Annual Exam & CAD Med check & due for blood work; Wellness Paperwork for work   Menstrual Status :   N/A   Weight Measured :   208 lb(Converted to: 208 lb 0 oz, 94.35 kg)    Height Measured :   65.75 in(Converted to: 5 ft 6 in, 167.00 cm)    Body Mass Index :   33.82 kg/m2 (HI)    Body Surface Area :   2.09 m2   Systolic Blood Pressure :   122 mmHg   Diastolic Blood Pressure :   70 mmHg   Mean Arterial Pressure :   87 mmHg   Peripheral Pulse Rate :   60 bpm   BP Site :   Left arm   Pulse Site :   Radial artery   BP Method :   Manual   HR Method :   Manual   Sharron Mckenzie CMA - 10/17/2019 4:09 PM CDT   Health Status   Allergies Verified? :   Yes   Medication History Verified? :   Yes   Medical History Verified? :   Yes   Pre-Visit Planning Status :   Completed   Tobacco Use? :   Former smoker   Sharron Mckenzie CMA - 10/17/2019 4:09 PM CDT   Demographics   Last Name :   JENNIFER   Address :   [address] [place] [address]   First Name :   DANIELLE Mtz Initial :   MAYA   Responsible Party Date of Birth () :   1950 CST   City :   [place]   State :   [place]   Zip Code :   [address]   Sharron Mckenzie CMA - 10/17/2019 4:09 PM CDT   Consents   Consent for Immunization Exchange :   Consent Granted   Consent for Immunizations to Providers :   Consent Granted   Sharron Mckenzie CMA - 10/17/2019 4:09 PM CDT   Meds / Allergies   (As Of: 10/17/2019 4:13:30 PM CDT)   Allergies (Active)   No Known Medication Allergies  Estimated Onset Date:   Unspecified ; Created By:   Zahida Kramer CMA; Reaction Status:   Active ; Category:   Drug ; Substance:   No Known Medication Allergies ; Type:   Allergy ; Updated By:   Zahida rKamer CMA; Reviewed Date:   10/17/2019 4:10 PM CDT        Medication List   (As Of: 10/17/2019 4:13:30 PM CDT)   Prescription/Discharge Order    aspirin  :   aspirin ; Status:    Prescribed ; Ordered As Mnemonic:   aspirin 81 mg oral tablet ; Simple Display Line:   81 mg, 1 tab(s), PO, Daily, 30 tab(s), 0 Refill(s) ; Ordering Provider:   Renny Albrecht PA-C; Catalog Code:   aspirin ; Order Dt/Tm:   5/8/2019 3:08:07 PM CDT          atorvastatin  :   atorvastatin ; Status:   Prescribed ; Ordered As Mnemonic:   atorvastatin 80 mg oral tablet ; Simple Display Line:   80 mg, 1 tab(s), PO, Daily, 90 tab(s), 3 Refill(s) ; Ordering Provider:   Malka Borrego MD; Catalog Code:   atorvastatin ; Order Dt/Tm:   10/30/2018 6:18:32 PM CDT          betamethasone-clotrimazole topical  :   betamethasone-clotrimazole topical ; Status:   Completed ; Ordered As Mnemonic:   Lotrisone 1%-0.05% topical cream ; Simple Display Line:   1 addie, TOP, BID, 45 g, 1 Refill(s) ; Ordering Provider:   Malka Borrego MD; Catalog Code:   betamethasone-clotrimazole topical ; Order Dt/Tm:   10/17/2017 5:26:26 PM CDT          lisinopril  :   lisinopril ; Status:   Prescribed ; Ordered As Mnemonic:   lisinopril 2.5 mg oral tablet ; Simple Display Line:   2.5 mg, 1 tab(s), PO, Daily, 90 tab(s), 3 Refill(s) ; Ordering Provider:   Malka Borrego MD; Catalog Code:   lisinopril ; Order Dt/Tm:   10/30/2018 6:18:29 PM CDT          metoprolol  :   metoprolol ; Status:   Prescribed ; Ordered As Mnemonic:   Metoprolol Tartrate 25 mg oral tablet ; Simple Display Line:   25 mg, 1 tab(s), po, bid, 180 tab(s), 3 Refill(s) ; Ordering Provider:   Malka Borrego MD; Catalog Code:   metoprolol ; Order Dt/Tm:   10/30/2018 6:18:31 PM CDT            Home Meds    ascorbic acid  :   ascorbic acid ; Status:   Documented ; Ordered As Mnemonic:   Vitamin C 500 mg oral tablet ; Simple Display Line:   500 mg, 1 tab(s), po, daily, 0 Refill(s) ; Catalog Code:   ascorbic acid ; Order Dt/Tm:   10/13/2015 12:14:23 PM CDT          cholecalciferol  :   cholecalciferol ; Status:   Documented ; Ordered As Mnemonic:   Vitamin D3 1000 intl units oral tablet ;  Simple Display Line:   1,000 International Unit, 1 tab(s), po, daily, 0 Refill(s) ; Catalog Code:   cholecalciferol ; Order Dt/Tm:   10/13/2015 12:15:13 PM CDT          cinnamon  :   cinnamon ; Status:   Completed ; Ordered As Mnemonic:   cinnamon ; Simple Display Line:   1,000 mg, po, 0 Refill(s) ; Catalog Code:   cinnamon ; Order Dt/Tm:   10/13/2015 12:09:36 PM CDT          ginkgo  :   ginkgo ; Status:   Completed ; Ordered As Mnemonic:   Ginkgo oral tablet ; Simple Display Line:   0 Refill(s) ; Catalog Code:   ginkgo ; Order Dt/Tm:   10/13/2015 12:11:29 PM CDT          multivitamin with minerals  :   multivitamin with minerals ; Status:   Completed ; Ordered As Mnemonic:   Multiple Vitamins with Minerals oral tablet ; Simple Display Line:   1 tab(s), po, daily, 0 Refill(s) ; Catalog Code:   multivitamin with minerals ; Order Dt/Tm:   10/13/2015 12:12:36 PM CDT          omega-3 polyunsaturated fatty acids  :   omega-3 polyunsaturated fatty acids ; Status:   Completed ; Ordered As Mnemonic:   omega-3 polyunsaturated fatty acids oral capsule ; Simple Display Line:   0 Refill(s) ; Catalog Code:   omega-3 polyunsaturated fatty acids ; Order Dt/Tm:   10/13/2015 12:13:23 PM CDT          vitamin A  :   vitamin A ; Status:   Completed ; Ordered As Mnemonic:   vitamin A ; Simple Display Line:   0 Refill(s) ; Catalog Code:   vitamin A ; Order Dt/Tm:   10/13/2015 12:14:03 PM CDT

## 2022-02-16 NOTE — TELEPHONE ENCOUNTER
---------------------  From: Geetha Kelly MA (eRx Pool ([id number]))   To: [facility] Message Pool ([id number]);     Sent: 12/1/2021 9:52:35 AM CST  Subject: FW: Medication Management   Due Date/Time: 12/1/2021 11:58:00 AM CST           ------------------------------------------  From: [facility] Miguel Ángel  To: Jordan Ahn MD  Sent: November 30, 2021 11:58:55 AM CST  Subject: Medication Management  Due: November 16, 2021 3:37:48 PM CST     ** On Hold Pending Signature **     Dispensed Drug: metoprolol (Metoprolol Tartrate 25 mg oral tablet), TAKE ONE TABLET BY MOUTH TWICE DAILY  Quantity: 60 tab(s)  Days Supply: 30  Refills: 0  Substitutions Allowed  Notes from Pharmacy:     ** On Hold Pending Signature **     Dispensed Drug: lisinopril (lisinopril 2.5 mg oral tablet), TAKE ONE TABLET BY MOUTH DAILY  Quantity: 30 tab(s)  Days Supply: 30  Refills: 0  Substitutions Allowed  Notes from Pharmacy:  ------------------------------------------  ** Submitted: **  Order:lisinopril (lisinopril 2.5 mg oral tablet)  1 tab(s)  Oral  daily  Qty:  30 tab(s)        Days Supply:  30        Refills:  0          Substitutions Allowed     Route To Pharmacy - [facility] Miguel [place]ngel  [place]    Signed by Geetha Kelly MA  12/1/2021 6:42:00 PM [place]    ** Submitted: **  Complete:lisinopril (lisinopril 2.5 mg oral tablet)   Signed by Geetha Kelly MA  12/1/2021 6:43:00 PM UT    ** Not Approved:  **  lisinopril (lisinopril 2.5 mg tablet)  TAKE ONE TABLET BY MOUTH DAILY  Qty:  30 tab(s)        Days Supply:  30        Refills:  0          Substitutions Allowed     Route To Pharmacy - [facility] Miguel Ángel  [place]   Signed by Geetha Kelly MA---------------------  From: Geetha Kelly MA   To: [facility] Miguel Ángel Riley    Sent: 12/1/2021 12:43:41 PM CST  Subject: FW: Medication  Management     ** Submitted: **  Order:metoprolol (Metoprolol Tartrate 25 mg oral tablet)  1 tab(s)  Oral  bid  Qty:  60 tab(s)        Days Supply:  30        Refills:  0          Substitutions Allowed     Route To [facility]    Signed by Geetha Kelly MA  12/1/2021 6:43:00 PM [facility]    ** Submitted: **  Complete:metoprolol (Metoprolol Tartrate 25 mg oral tablet)   Signed by Geetha Kelly MA  12/1/2021 6:43:00 PM [facility]    ** Not Approved:  **  metoprolol (metoprolol tartrate 25 mg tablet)  TAKE ONE TABLET BY MOUTH TWICE DAILY  Qty:  60 tab(s)        Days Supply:  30        Refills:  0          Substitutions Allowed     Route To [facility] Miguel Ángel  [place]   Signed by Leslie Kelly MA past due, reminder letters sent, message sent to pharmacy

## 2022-02-16 NOTE — LETTER
(Inserted Image. Unable to display)   [address] [place]  [address]  [phone]    October 22, 2019      DANIELLE RODRIGUEZ      [address]   [place], [place] [id number]        Dear DANIELLE,    Thank you for selecting [facility] for your healthcare needs. Below you will find the result of your recent test(s) done at our clinic.     These are all fine.      Result Name Current Result Previous Result Reference Range   Sodium Level (mmol/L)  138 10/21/2019  141 11/5/2018 135 - 146   Potassium Level (mmol/L)  5.2 10/21/2019  4.2 11/5/2018 3.5 - 5.3   Chloride Level (mmol/L)  104 10/21/2019  108 11/5/2018 98 - 110   CO2 Level (mmol/L)  26 10/21/2019  22 11/5/2018 20 - 32   Glucose Level (mg/dL)  91 10/21/2019  98 11/5/2018 65 - 99   BUN (mg/dL)  17 10/21/2019  15 11/5/2018 7 - 25   Creatinine Level (mg/dL)  0.91 10/21/2019  0.79 11/5/2018 0.70 - 1.25   Calcium Level (mg/dL)  9.6 10/21/2019  9.1 11/5/2018 8.6 - 10.3   Cholesterol (mg/dL)  141 10/21/2019  140 11/5/2018  - <200   Non-HDL Cholesterol  100 10/21/2019  90 11/5/2018  - <130   HDL (mg/dL)  41 10/21/2019  50 11/5/2018 >40 -    Cholesterol/HDL Ratio  3.4 10/21/2019  2.8 11/5/2018  - <5.0   LDL  86 10/21/2019  76 11/5/2018    Triglyceride (mg/dL)  62 10/21/2019  61 11/5/2018  - <150   WBC  6.8 10/21/2019  3.8 - 10.8   RBC  5.51 10/21/2019  4.20 - 5.80   Hgb (gm/dL)  16.2 10/21/2019  13.2 - 17.1   Hct (%)  47.3 10/21/2019  38.5 - 50.0   MCV (fL)  85.8 10/21/2019  80.0 - 100.0   MCH (pg)  29.4 10/21/2019  27.0 - 33.0   MCHC (gm/dL)  34.2 10/21/2019  32.0 - 36.0   RDW (%)  12.6 10/21/2019  11.0 - 15.0   Platelet  271 10/21/2019  140 - 400   MPV (fL)  10.5 10/21/2019  7.5 - 12.5       Please contact my practice at [phone] if you have any questions or concerns.      Sincerely,        Ladarius Delvalle MD ,   Jeimy Borrego MD,   Renny Albrecht PA-C

## 2022-02-16 NOTE — NURSING NOTE
Depression Screening Entered On:  10/18/2019 9:18 AM CDT    Performed On:  10/18/2019 9:17 AM CDT by Elmira Figueroa MA               Depression Screening   Little Interest - Pleasure in Activities :   Not at all   Feeling Down, Depressed, Hopeless :   Not at all   Initial Depression Screen Score :   0    Trouble Falling or Staying Asleep :   Not at all   Feeling Tired or Little Energy :   Not at all   Poor Appetite or Overeating :   Not at all   Feeling Bad About Yourself :   Not at all   Trouble Concentrating :   Not at all   Moving or Speaking Slowly :   Not at all   Thoughts Better Off Dead or Hurting Self :   Not at all   Detailed Depression Screen Score :   0    Total Depression Screen Score :   0    ODILIA Difficulty with Work, Home, Others :   Not difficult at all   Elmira Figueroa MA - 10/18/2019 9:17 AM CDT

## 2022-03-02 NOTE — LETTER
(Inserted Image. Unable to display)   January 06, 2022  [address] [address]        Dear DANIELLE,    Thank you for selecting [facility] for your healthcare needs.    Our records indicate you are due for the following services:     Annual Physical   Fasting Lab Tests ~ Please do not eat or drink anything 10 hours prior to your scheduled appointment time.  (Water and any medications that you may need are allowed unless directed otherwise.)     If you had your labs done at another facility or with Direct Access Lab Testing at [facility], please bring in a copy of the results to your next visit, mail a copy, or drop off a copy of your results to your Healthcare Provider.     (FYI   Regarding office visits: In some instances, a video visit or telephone visit may be offered as an option.)    To schedule an appointment or if you have further questions, please contact your clinic at [phone].    Powered by [employer]    Sincerely,    Jordan Ahn MD

## 2022-03-02 NOTE — TELEPHONE ENCOUNTER
---------------------  From: Geetha Kelly MA (eRx Pool ([id number]_[facility]))   To: [facility] Message Pool ([id number]);     Sent: 1/4/2022 10:17:59 AM CST  Subject: FW: Medication Management   Due Date/Time: 1/5/2022 10:05:00 AM CST           ------------------------------------------  From: [facility] Miguel Ángel  To: Jordan Ahn MD  Sent: January 4, 2022 10:05:38 AM CST  Subject: Medication Management  Due: December 15, 2021 8:20:29 PM CST     ** On Hold Pending Signature **     Drug: lisinopril (lisinopril 2.5 mg oral tablet), 1 tab(s) Oral daily  Quantity: 30 tab(s)  Days Supply: 0  Refills: 0  Substitutions Allowed  Notes from Pharmacy: No further refills until pt is seen per [facility].     Dispensed Drug: lisinopril (lisinopril 2.5 mg oral tablet), TAKE ONE TABLET BY MOUTH DAILY  Quantity: 30 tab(s)  Days Supply: 30  Refills: 0  Substitutions Allowed  Notes from Pharmacy:     ** On Hold Pending Signature **     Drug: metoprolol (Metoprolol Tartrate 25 mg oral tablet), 1 tab(s) Oral bid  Quantity: 60 tab(s)  Days Supply: 0  Refills: 0  Substitutions Allowed  Notes from Pharmacy: No further refills until pt is seen per [facility].     Dispensed Drug: metoprolol (Metoprolol Tartrate 25 mg oral tablet), TAKE ONE TABLET BY MOUTH TWICE DAILY DAILY * NEEDS TO BE SEEN FOR FURTHER REFILLS *  Quantity: 60 tab(s)  Days Supply: 30  Refills: 0  Substitutions Allowed  Notes from Pharmacy:  ------------------------------------------  ** Submitted: **  Order:lisinopril (lisinopril 2.5 mg oral tablet)  1 tab(s)  Oral  daily  Qty:  30 tab(s)        Days Supply:  30        Refills:  0          Substitutions Allowed     Route To Pharmacy - [facility]    Signed by Leticia BOURNE Geetha  1/4/2022 4:36:00 PM UT    ** Submitted: **  Complete:lisinopril (lisinopril 2.5 mg oral tablet)   Signed by Geetha Kelly MA  1/4/2022 4:36:00 PM UT    ** Not Approved:   **  lisinopril (lisinopril 2.5 mg tablet)  TAKE ONE TABLET BY MOUTH DAILY  Qty:  30 tab(s)        Days Supply:  30        Refills:  0          Substitutions Allowed     Route To [facility]   Signed by Geetha Kelly MA---------------------  From: Geetha Kelly MA   To: [facility]    Sent: 1/4/2022 10:37:10 AM [facility]  Subject: FW: Medication Management     ** Submitted: **  Order:metoprolol (Metoprolol Tartrate 25 mg oral tablet)  1 tab(s)  Oral  bid  Qty:  60 tab(s)        Refills:  0          Substitutions Allowed     Route To [facility]    Signed by Geetha Kelly MA  1/4/2022 4:36:00 PM [facility]    ** Submitted: **  Complete:metoprolol (Metoprolol Tartrate 25 mg oral tablet)   Signed by Geetha Kelly MA  1/4/2022 4:37:00 PM [facility]    ** Not Approved:  **  metoprolol (metoprolol tartrate 25 mg tablet)  TAKE ONE TABLET BY MOUTH TWICE DAILY DAILY * NEEDS TO BE SEEN FOR FURTHER REFILLS *  Qty:  60 tab(s)        Days Supply:  30        Refills:  0          Substitutions Allowed     Route To [facility]   Signed by [facility] past due, reminder letters sent, message sent to [facility]